# Patient Record
(demographics unavailable — no encounter records)

---

## 2025-05-07 NOTE — HISTORY OF PRESENT ILLNESS
[FreeTextEntry1] : 74-year-old man Cardiology consultation requested because of a family history of hypertrophic cardiomyopathy. Pasqual he has no known heart disease.  There is no history of myocardial infarction angina or congestive heart failure.  He reports undergoing a stress study 1 to 2 years ago which was reportedly normal.  That study/evaluation is not available for review at this time. Mr. Ms. Caba brother was diagnosed as having hypertrophic cardiomyopathy and required a permanent pacemaker.  Due to concern that family history he was advised to have a cardiology consultation.  Pulse quality denies any symptoms of chest pain shortness of breath palpitations or syncope. There is a prior history of obesity hypertension and hyperlipidemia.  There is no history of diabetes.  He smoked 1 pack of cigarettes daily stopping 33 years ago.

## 2025-05-07 NOTE — DISCUSSION/SUMMARY
[FreeTextEntry1] : Hypertrophic cardiomyopathy Tesfaye reports that his brother has a diagnosis of hypertrophic cardiomyopathy.  There is a genetic predisposition to this disorder.  However the present cardiac physical examination and electrocardiogram are not suggestive of this diagnosis.  Echocardiography would be helpful for further evaluation.  I have recommended the following: Echocardiogram as discussed above    Multiple risk factors for atherosclerotic heart disease Tesfaye has multiple risk factors for development of atherosclerotic heart disease.  However there is no evidence of such to date.  There are no symptoms of angina and reportedly previous noninvasive studies have not shown evidence of coronary disease.  The  5/5/25 electrocardiogram shows no evidence of myocardial ischemia or infarction.  Measures to control modifiable risk factors for the development of atherosclerotic disease will be important long-term management.  I have recommended the following: Prior cardiac medical records not available at this time are requested for review Further noninvasive studies dependent on review of the previous data not presently available.    Hypertension  Hypertension is controlled on the present medical regimen.  The dose of ramipril may be increased if required to maintain optimal levels.  I have recommended the following: Continue the present medical regimen Home blood pressure monitoring Increase ramipril dose if required to maintain optimal levels as discussed above.    Hyperlipidemia Hyperlipidemia represents a risk factor for atherosclerotic heart disease.  The target LDL level for primary prevention is less than 100.  The most recent lipid levels are not available for review.  The dose of atorvastatin may be increased if required to obtain optimal levels.  I have recommended the following: Low-fat low-cholesterol low-salt heart healthy diet.  Regular aerobic exercise to the extent possible and weight loss Continue the present medical regimen Target LDL level to less than 100 as discussed above Increase atorvastatin dose if required to obtain optimal levels as noted above.    Overweight Being overweight exacerbates Mr. Conti's cardiovascular issues.  Today Tesfaye  is 6 feet tall and weighs 219 pounds.  Diet exercise and weight loss are advised.     The diagnosis, prognosis, risks, options and alternatives were explained at length to the patient.  All questions were answered.  Issues discussed included hypertrophic cardiomyopathy atherosclerotic heart disease hypertension hyperlipidemia noninvasive cardiac testing diet and exercise

## 2025-05-07 NOTE — REVIEW OF SYSTEMS
[Feeling Fatigued] : feeling fatigued [Joint Pain] : joint pain [Negative] : Heme/Lymph [FreeTextEntry5] : se history of present ilness

## 2025-05-07 NOTE — PHYSICAL EXAM
[Normal S1, S2] : normal S1, S2 [Clear Lung Fields] : clear lung fields [Soft] : abdomen soft [Non Tender] : non-tender [Normal Bowel Sounds] : normal bowel sounds [Normal Gait] : normal gait [No Rash] : no rash [No Focal Deficits] : no focal deficits [de-identified] : Appears overweight no distress lying flat [de-identified] : No neck vein distention.  No carotid bruit. [de-identified] : No murmur no gallop.  No diastolic sounds. [de-identified] : No peripheral edema.  Feet warm and well-perfused.  No ulcerations [de-identified] : pleasant